# Patient Record
Sex: FEMALE | Race: WHITE | Employment: FULL TIME | URBAN - METROPOLITAN AREA
[De-identification: names, ages, dates, MRNs, and addresses within clinical notes are randomized per-mention and may not be internally consistent; named-entity substitution may affect disease eponyms.]

---

## 2017-12-12 PROCEDURE — 88175 CYTOPATH C/V AUTO FLUID REDO: CPT | Performed by: OBSTETRICS & GYNECOLOGY

## 2017-12-12 PROCEDURE — 87624 HPV HI-RISK TYP POOLED RSLT: CPT | Performed by: OBSTETRICS & GYNECOLOGY

## 2021-03-29 ENCOUNTER — LAB REQUISITION (OUTPATIENT)
Dept: LAB | Facility: HOSPITAL | Age: 61
End: 2021-03-29
Payer: COMMERCIAL

## 2021-03-29 DIAGNOSIS — K13.79 OTHER LESIONS OF ORAL MUCOSA: ICD-10-CM

## 2021-03-29 PROCEDURE — 88305 TISSUE EXAM BY PATHOLOGIST: CPT | Performed by: DENTIST

## 2022-01-30 ENCOUNTER — ANESTHESIA EVENT (OUTPATIENT)
Dept: SURGERY | Facility: HOSPITAL | Age: 62
End: 2022-01-30
Payer: COMMERCIAL

## 2022-01-30 ENCOUNTER — APPOINTMENT (OUTPATIENT)
Dept: CT IMAGING | Facility: HOSPITAL | Age: 62
End: 2022-01-30
Attending: EMERGENCY MEDICINE
Payer: COMMERCIAL

## 2022-01-30 ENCOUNTER — ANESTHESIA (OUTPATIENT)
Dept: SURGERY | Facility: HOSPITAL | Age: 62
End: 2022-01-30
Payer: COMMERCIAL

## 2022-01-30 ENCOUNTER — HOSPITAL ENCOUNTER (EMERGENCY)
Facility: HOSPITAL | Age: 62
Discharge: HOME | End: 2022-01-31
Attending: EMERGENCY MEDICINE
Payer: COMMERCIAL

## 2022-01-30 DIAGNOSIS — K35.80 ACUTE APPENDICITIS: ICD-10-CM

## 2022-01-30 DIAGNOSIS — K35.80 ACUTE APPENDICITIS, UNSPECIFIED ACUTE APPENDICITIS TYPE: Primary | ICD-10-CM

## 2022-01-30 DIAGNOSIS — I10 HYPERTENSION, UNSPECIFIED TYPE: ICD-10-CM

## 2022-01-30 LAB
ALBUMIN SERPL-MCNC: 3.8 G/DL (ref 3.4–5)
ALBUMIN/GLOB SERPL: 1.1 {RATIO} (ref 1–2)
ALP LIVER SERPL-CCNC: 69 U/L
ALT SERPL-CCNC: 17 U/L
ANION GAP SERPL CALC-SCNC: 6 MMOL/L (ref 0–18)
AST SERPL-CCNC: 14 U/L (ref 15–37)
BASOPHILS # BLD AUTO: 0.03 X10(3) UL (ref 0–0.2)
BASOPHILS NFR BLD AUTO: 0.3 %
BILIRUB SERPL-MCNC: 1 MG/DL (ref 0.1–2)
BILIRUB UR QL STRIP.AUTO: NEGATIVE
BUN BLD-MCNC: 7 MG/DL (ref 7–18)
CALCIUM BLD-MCNC: 9 MG/DL (ref 8.5–10.1)
CHLORIDE SERPL-SCNC: 102 MMOL/L (ref 98–112)
CLARITY UR REFRACT.AUTO: CLEAR
CO2 SERPL-SCNC: 27 MMOL/L (ref 21–32)
CREAT BLD-MCNC: 0.81 MG/DL
EOSINOPHIL # BLD AUTO: 0.01 X10(3) UL (ref 0–0.7)
EOSINOPHIL NFR BLD AUTO: 0.1 %
ERYTHROCYTE [DISTWIDTH] IN BLOOD BY AUTOMATED COUNT: 13.6 %
GLOBULIN PLAS-MCNC: 3.6 G/DL (ref 2.8–4.4)
GLUCOSE BLD-MCNC: 93 MG/DL (ref 70–99)
GLUCOSE UR STRIP.AUTO-MCNC: NEGATIVE MG/DL
HCT VFR BLD AUTO: 35.4 %
HGB BLD-MCNC: 12.1 G/DL
IMM GRANULOCYTES # BLD AUTO: 0.02 X10(3) UL (ref 0–1)
IMM GRANULOCYTES NFR BLD: 0.2 %
LEUKOCYTE ESTERASE UR QL STRIP.AUTO: NEGATIVE
LIPASE SERPL-CCNC: 109 U/L (ref 73–393)
LYMPHOCYTES # BLD AUTO: 1.42 X10(3) UL (ref 1–4)
LYMPHOCYTES NFR BLD AUTO: 13.2 %
MCHC RBC AUTO-ENTMCNC: 34.2 G/DL (ref 31–37)
MCV RBC AUTO: 86.6 FL
MONOCYTES # BLD AUTO: 0.69 X10(3) UL (ref 0.1–1)
MONOCYTES NFR BLD AUTO: 6.4 %
NEUTROPHILS # BLD AUTO: 8.61 X10 (3) UL (ref 1.5–7.7)
NEUTROPHILS # BLD AUTO: 8.61 X10(3) UL (ref 1.5–7.7)
NEUTROPHILS NFR BLD AUTO: 79.8 %
NITRITE UR QL STRIP.AUTO: NEGATIVE
OSMOLALITY SERPL CALC.SUM OF ELEC: 278 MOSM/KG (ref 275–295)
PH UR STRIP.AUTO: 6 [PH] (ref 5–8)
PLATELET # BLD AUTO: 204 10(3)UL (ref 150–450)
POTASSIUM SERPL-SCNC: 3.6 MMOL/L (ref 3.5–5.1)
PROT SERPL-MCNC: 7.4 G/DL (ref 6.4–8.2)
PROT UR STRIP.AUTO-MCNC: NEGATIVE MG/DL
RBC # BLD AUTO: 4.09 X10(6)UL
SARS-COV-2 RNA RESP QL NAA+PROBE: NOT DETECTED
SODIUM SERPL-SCNC: 135 MMOL/L (ref 136–145)
SP GR UR STRIP.AUTO: 1 (ref 1–1.03)
UROBILINOGEN UR STRIP.AUTO-MCNC: <2 MG/DL
WBC # BLD AUTO: 10.8 X10(3) UL (ref 4–11)

## 2022-01-30 PROCEDURE — 85025 COMPLETE CBC W/AUTO DIFF WBC: CPT

## 2022-01-30 PROCEDURE — 81001 URINALYSIS AUTO W/SCOPE: CPT | Performed by: EMERGENCY MEDICINE

## 2022-01-30 PROCEDURE — 80053 COMPREHEN METABOLIC PANEL: CPT

## 2022-01-30 PROCEDURE — 88342 IMHCHEM/IMCYTCHM 1ST ANTB: CPT | Performed by: SURGERY

## 2022-01-30 PROCEDURE — 88341 IMHCHEM/IMCYTCHM EA ADD ANTB: CPT | Performed by: SURGERY

## 2022-01-30 PROCEDURE — 85025 COMPLETE CBC W/AUTO DIFF WBC: CPT | Performed by: EMERGENCY MEDICINE

## 2022-01-30 PROCEDURE — 88304 TISSUE EXAM BY PATHOLOGIST: CPT | Performed by: SURGERY

## 2022-01-30 PROCEDURE — 96361 HYDRATE IV INFUSION ADD-ON: CPT

## 2022-01-30 PROCEDURE — 80053 COMPREHEN METABOLIC PANEL: CPT | Performed by: EMERGENCY MEDICINE

## 2022-01-30 PROCEDURE — 83690 ASSAY OF LIPASE: CPT | Performed by: EMERGENCY MEDICINE

## 2022-01-30 PROCEDURE — 99285 EMERGENCY DEPT VISIT HI MDM: CPT

## 2022-01-30 PROCEDURE — 96375 TX/PRO/DX INJ NEW DRUG ADDON: CPT

## 2022-01-30 PROCEDURE — 74177 CT ABD & PELVIS W/CONTRAST: CPT | Performed by: EMERGENCY MEDICINE

## 2022-01-30 PROCEDURE — 96374 THER/PROPH/DIAG INJ IV PUSH: CPT

## 2022-01-30 PROCEDURE — 0DTJ4ZZ RESECTION OF APPENDIX, PERCUTANEOUS ENDOSCOPIC APPROACH: ICD-10-PCS | Performed by: SURGERY

## 2022-01-30 RX ORDER — SODIUM CHLORIDE 9 MG/ML
125 INJECTION, SOLUTION INTRAVENOUS CONTINUOUS
Status: DISCONTINUED | OUTPATIENT
Start: 2022-01-30 | End: 2022-01-31

## 2022-01-30 RX ORDER — NEOSTIGMINE METHYLSULFATE 1 MG/ML
INJECTION INTRAVENOUS AS NEEDED
Status: DISCONTINUED | OUTPATIENT
Start: 2022-01-30 | End: 2022-02-02 | Stop reason: SURG

## 2022-01-30 RX ORDER — ONDANSETRON 4 MG/1
4 TABLET, ORALLY DISINTEGRATING ORAL EVERY 4 HOURS PRN
Qty: 10 TABLET | Refills: 0 | Status: SHIPPED | OUTPATIENT
Start: 2022-01-30

## 2022-01-30 RX ORDER — DEXAMETHASONE SODIUM PHOSPHATE 4 MG/ML
VIAL (ML) INJECTION AS NEEDED
Status: DISCONTINUED | OUTPATIENT
Start: 2022-01-30 | End: 2022-02-02 | Stop reason: SURG

## 2022-01-30 RX ORDER — ONDANSETRON 2 MG/ML
4 INJECTION INTRAMUSCULAR; INTRAVENOUS ONCE
Status: COMPLETED | OUTPATIENT
Start: 2022-01-30 | End: 2022-01-30

## 2022-01-30 RX ORDER — ONDANSETRON 2 MG/ML
INJECTION INTRAMUSCULAR; INTRAVENOUS AS NEEDED
Status: DISCONTINUED | OUTPATIENT
Start: 2022-01-30 | End: 2022-02-02 | Stop reason: SURG

## 2022-01-30 RX ORDER — SODIUM CHLORIDE, SODIUM LACTATE, POTASSIUM CHLORIDE, CALCIUM CHLORIDE 600; 310; 30; 20 MG/100ML; MG/100ML; MG/100ML; MG/100ML
INJECTION, SOLUTION INTRAVENOUS CONTINUOUS
Status: DISCONTINUED | OUTPATIENT
Start: 2022-01-30 | End: 2022-01-31 | Stop reason: HOSPADM

## 2022-01-30 RX ORDER — ONDANSETRON 2 MG/ML
4 INJECTION INTRAMUSCULAR; INTRAVENOUS AS NEEDED
Status: DISCONTINUED | OUTPATIENT
Start: 2022-01-30 | End: 2022-01-31 | Stop reason: HOSPADM

## 2022-01-30 RX ORDER — HYDROMORPHONE HYDROCHLORIDE 1 MG/ML
0.4 INJECTION, SOLUTION INTRAMUSCULAR; INTRAVENOUS; SUBCUTANEOUS EVERY 5 MIN PRN
Status: DISCONTINUED | OUTPATIENT
Start: 2022-01-30 | End: 2022-01-31 | Stop reason: HOSPADM

## 2022-01-30 RX ORDER — METOCLOPRAMIDE HYDROCHLORIDE 5 MG/ML
INJECTION INTRAMUSCULAR; INTRAVENOUS AS NEEDED
Status: DISCONTINUED | OUTPATIENT
Start: 2022-01-30 | End: 2022-02-02 | Stop reason: SURG

## 2022-01-30 RX ORDER — HYDROCODONE BITARTRATE AND ACETAMINOPHEN 5; 325 MG/1; MG/1
1 TABLET ORAL AS NEEDED
Status: COMPLETED | OUTPATIENT
Start: 2022-01-30 | End: 2022-01-30

## 2022-01-30 RX ORDER — ONDANSETRON 2 MG/ML
INJECTION INTRAMUSCULAR; INTRAVENOUS
Status: COMPLETED
Start: 2022-01-30 | End: 2022-01-30

## 2022-01-30 RX ORDER — MEPERIDINE HYDROCHLORIDE 25 MG/ML
12.5 INJECTION INTRAMUSCULAR; INTRAVENOUS; SUBCUTANEOUS AS NEEDED
Status: DISCONTINUED | OUTPATIENT
Start: 2022-01-30 | End: 2022-01-31 | Stop reason: HOSPADM

## 2022-01-30 RX ORDER — GLYCOPYRROLATE 0.2 MG/ML
INJECTION, SOLUTION INTRAMUSCULAR; INTRAVENOUS AS NEEDED
Status: DISCONTINUED | OUTPATIENT
Start: 2022-01-30 | End: 2022-02-02 | Stop reason: SURG

## 2022-01-30 RX ORDER — ROCURONIUM BROMIDE 10 MG/ML
INJECTION, SOLUTION INTRAVENOUS AS NEEDED
Status: DISCONTINUED | OUTPATIENT
Start: 2022-01-30 | End: 2022-02-02 | Stop reason: SURG

## 2022-01-30 RX ORDER — BUPIVACAINE HYDROCHLORIDE 5 MG/ML
INJECTION, SOLUTION EPIDURAL; INTRACAUDAL AS NEEDED
Status: DISCONTINUED | OUTPATIENT
Start: 2022-01-30 | End: 2022-01-30 | Stop reason: HOSPADM

## 2022-01-30 RX ORDER — TRAMADOL HYDROCHLORIDE 50 MG/1
TABLET ORAL EVERY 4 HOURS PRN
Qty: 10 TABLET | Refills: 0 | Status: SHIPPED | OUTPATIENT
Start: 2022-01-30

## 2022-01-30 RX ORDER — HYDROMORPHONE HYDROCHLORIDE 1 MG/ML
0.5 INJECTION, SOLUTION INTRAMUSCULAR; INTRAVENOUS; SUBCUTANEOUS EVERY 30 MIN PRN
Status: DISCONTINUED | OUTPATIENT
Start: 2022-01-30 | End: 2022-01-31

## 2022-01-30 RX ORDER — NALOXONE HYDROCHLORIDE 0.4 MG/ML
80 INJECTION, SOLUTION INTRAMUSCULAR; INTRAVENOUS; SUBCUTANEOUS AS NEEDED
Status: DISCONTINUED | OUTPATIENT
Start: 2022-01-30 | End: 2022-01-31 | Stop reason: HOSPADM

## 2022-01-30 RX ORDER — HYDROCODONE BITARTRATE AND ACETAMINOPHEN 5; 325 MG/1; MG/1
TABLET ORAL
Status: COMPLETED
Start: 2022-01-30 | End: 2022-01-30

## 2022-01-30 RX ORDER — MIDAZOLAM HYDROCHLORIDE 1 MG/ML
1 INJECTION INTRAMUSCULAR; INTRAVENOUS EVERY 5 MIN PRN
Status: DISCONTINUED | OUTPATIENT
Start: 2022-01-30 | End: 2022-01-31 | Stop reason: HOSPADM

## 2022-01-30 RX ORDER — KETOROLAC TROMETHAMINE 30 MG/ML
INJECTION, SOLUTION INTRAMUSCULAR; INTRAVENOUS AS NEEDED
Status: DISCONTINUED | OUTPATIENT
Start: 2022-01-30 | End: 2022-02-02 | Stop reason: SURG

## 2022-01-30 RX ORDER — HYDROCODONE BITARTRATE AND ACETAMINOPHEN 5; 325 MG/1; MG/1
2 TABLET ORAL AS NEEDED
Status: COMPLETED | OUTPATIENT
Start: 2022-01-30 | End: 2022-01-30

## 2022-01-30 RX ADMIN — DEXAMETHASONE SODIUM PHOSPHATE 4 MG: 4 MG/ML VIAL (ML) INJECTION at 22:43:00

## 2022-01-30 RX ADMIN — NEOSTIGMINE METHYLSULFATE 3 MG: 1 INJECTION INTRAVENOUS at 22:37:00

## 2022-01-30 RX ADMIN — ONDANSETRON 4 MG: 2 INJECTION INTRAMUSCULAR; INTRAVENOUS at 22:43:00

## 2022-01-30 RX ADMIN — KETOROLAC TROMETHAMINE 30 MG: 30 INJECTION, SOLUTION INTRAMUSCULAR; INTRAVENOUS at 22:49:00

## 2022-01-30 RX ADMIN — ROCURONIUM BROMIDE 10 MG: 10 INJECTION, SOLUTION INTRAVENOUS at 21:54:00

## 2022-01-30 RX ADMIN — METOCLOPRAMIDE HYDROCHLORIDE 10 MG: 5 INJECTION INTRAMUSCULAR; INTRAVENOUS at 22:43:00

## 2022-01-30 RX ADMIN — GLYCOPYRROLATE 0.4 MG: 0.2 INJECTION, SOLUTION INTRAMUSCULAR; INTRAVENOUS at 22:37:00

## 2022-01-30 NOTE — ED INITIAL ASSESSMENT (HPI)
Pt presents to ER with abd pain starting yesterday. Pt first noted nausea and gas yesterday afternoon. Pt states feeling full. Last BM yesterday morning. No vomiting. Yes nausea. Slight fever. Pt is speaking clearly. Skin warm and dry. Respirations equal and nonlabored. Pt is a&ox3. RLQ tenderness noted.

## 2022-01-31 VITALS
WEIGHT: 128 LBS | OXYGEN SATURATION: 97 % | SYSTOLIC BLOOD PRESSURE: 97 MMHG | HEART RATE: 58 BPM | HEIGHT: 64 IN | BODY MASS INDEX: 21.85 KG/M2 | RESPIRATION RATE: 17 BRPM | TEMPERATURE: 98 F | DIASTOLIC BLOOD PRESSURE: 62 MMHG

## 2022-01-31 NOTE — ED QUICK NOTES
Patient resting comfortably on bed, respirations easy and unlabored. Denies any needs at this time.   Pain 4/10, patient states that she does not require any pain medication at this time

## 2022-01-31 NOTE — ANESTHESIA PROCEDURE NOTES
Airway  Date/Time: 1/30/2022 9:58 PM  Urgency: elective      General Information and Staff    Patient location during procedure: OR  Anesthesiologist: Madeline Bentley MD  Performed: anesthesiologist     Indications and Patient Condition  Indications for airway management: anesthesia  Sedation level: deep  Preoxygenated: yes  Patient position: sniffing  Mask difficulty assessment: 1 - vent by mask    Final Airway Details  Final airway type: endotracheal airway      Successful airway: ETT  Cuffed: yes   Successful intubation technique: Video laryngoscopy  Endotracheal tube insertion site: oral  Blade: GlideScope  Blade size: #4  ETT size (mm): 7.5    Cormack-Lehane Classification: grade I - full view of glottis  Placement verified by: chest auscultation and capnometry   Measured from: lips  Number of attempts at approach: 1

## 2022-01-31 NOTE — OPERATIVE REPORT
[facility]         Patient Name: Jorje Acuna   MRN: [id number]     Date of Operation:  2022   Site:  [facility]       : 1/15/1960       PREOPERATIVE DIAGNOSES:  Acute appendicitis     POSTOPERATIVE DIAGNOSES:  Same     PROCEDURE PERFORMED:  Laparoscopic appendectomy     SURGEON: ANTWON Mars: Scrub technician     ANESTHESIA:  General     COMPLICATIONS: None     ESTIMATED BLOOD LOSS:  5 mL     SPECIMEN:  Appendix     IMPLANTS: None     GRAFTS: None      DRAINS: None     BLOOD PRODUCTS ADMINISTERED: None     HISTORY: This patient is a 63year old-year-old female who has had abdominal pain for 2 days, localizing to the right lower quadrant today, and associated with nausea and fever. She was evaluated and found to have the clinical picture and CT scan consistent with acute appendicitis. She was seen in surgical consultation, and laparoscopic appendectomy was recommended. The risks, benefits, and alternatives were discussed with her and her . FINDINGS:  Uncomplicated acute appendicitis     PROCEDURE: The patient was seen in the emergency department with her . The surgical plan was reviewed. She was taken to the operating room, placed in the supine position, and administered general anesthesia. The abdomen was prepped and draped. A timeout was performed. Local anesthetic was infiltrated. A Veress needle was introduced below the umbilicus. The abdomen was insufflated with carbon dioxide gas. A 12 mm trocar was inserted, followed by the scope. There was no entry injury. Additional local anesthetic was infiltrated, and 2 additional 5 mm trochars were placed in the lower abdomen under laparoscopic vision. The abdomen and pelvis was inspected. There was a small amount of reactive fluid. The visualization of peritoneal surfaces, omentum, intestines, and liver surfaces was unremarkable. The patient was repositioned for right lower quadrant exposure.   The appendix was identified and noted to be thickened and inflamed. There was a small amount of fibrin. There was no gangrene or perforation. A window was made in the mesoappendix at the base of the cecum, and the mesoappendix was divided with harmonic scalpel. The appendix was amputated with a white load of the [place] stapler flush with the cecum. The appendix was extracted from the patient in a specimen retrieval bag and forwarded to pathology. The staple line was noted to be intact and hemostatic. The mesenteric resection line was hemostatic. The abdomen and pelvis was copiously irrigated and aspirated until the irrigant returned clear. Completion inspection of the abdominal cavity confirmed the absence of inadvertent injury. The omentum was positioned over the appendectomy site. The fascial closure device was used to secure the fascia at the 12 mm trocar site with 0 Vicryl suture. The remaining trochars were removed under direct vision internally to assure hemostasis. The abdomen was desufflated. The wounds were irrigated, rendered hemostatic, closed with Vicryl subcuticular stitches, and dressed with Dermabond. The needle, sponge, and instrument counts were reported as correct. The patient was awakened and transported to recovery. Her  was notified of the findings and her status.         Gardenia Méndez MD

## 2022-01-31 NOTE — ED QUICK NOTES
Patient's  leaving at this time. Patient's  taking necklace, ring, and purse at this time.     Patient states last ate at 1pm, and last drank at approx 2pm

## 2022-02-08 NOTE — PROGRESS NOTES
Noted.  Discussed with Dr. Lance Hanson. I called the patient today to discuss the findings. She is doing remarkably well after surgery. We discussed the pathology findings. We discussed that it is extremely fortunate that she developed acute appendicitis, in order for the small cancer to be identified. We discussed the negative margins, grade 2, extension into the wall of the appendix, etc.  I recommended a meeting with medical oncology, updated colonoscopy if she has not had one recently, and staging. To discuss further with medical oncology regarding the role of radical resection to include lymph node resection. It is possible she will require laparoscopic right hemicolectomy.

## 2022-02-17 PROBLEM — C18.1 CANCER OF APPENDIX (HCC): Status: ACTIVE | Noted: 2022-02-17

## 2022-11-29 ENCOUNTER — TELEPHONE (OUTPATIENT)
Dept: SURGERY | Facility: CLINIC | Age: 62
End: 2022-11-29

## 2022-12-14 ENCOUNTER — OFFICE VISIT (OUTPATIENT)
Dept: SURGERY | Facility: CLINIC | Age: 62
End: 2022-12-14
Payer: COMMERCIAL

## 2022-12-14 VITALS
OXYGEN SATURATION: 97 % | WEIGHT: 131 LBS | HEART RATE: 62 BPM | RESPIRATION RATE: 16 BRPM | BODY MASS INDEX: 22 KG/M2 | SYSTOLIC BLOOD PRESSURE: 113 MMHG | TEMPERATURE: 98 F | DIASTOLIC BLOOD PRESSURE: 67 MMHG

## 2022-12-14 DIAGNOSIS — C18.1 CANCER OF APPENDIX (HCC): Primary | ICD-10-CM

## 2022-12-14 PROBLEM — K35.80 ACUTE APPENDICITIS, UNSPECIFIED ACUTE APPENDICITIS TYPE: Status: RESOLVED | Noted: 2022-01-30 | Resolved: 2022-12-14

## 2022-12-21 ENCOUNTER — TELEPHONE (OUTPATIENT)
Dept: SURGERY | Facility: CLINIC | Age: 62
End: 2022-12-21

## 2022-12-21 DIAGNOSIS — C18.1 APPENDIX CARCINOMA (HCC): Primary | ICD-10-CM

## 2022-12-21 NOTE — TELEPHONE ENCOUNTER
Called patient with tumor board discussion. No answer. Recommend imaging in 6 months (May 2023) after which, yearly. Left a MyChart message for patient.

## 2022-12-22 ENCOUNTER — TELEPHONE (OUTPATIENT)
Dept: SURGERY | Facility: CLINIC | Age: 62
End: 2022-12-22

## 2023-05-01 ENCOUNTER — HOSPITAL ENCOUNTER (OUTPATIENT)
Dept: CT IMAGING | Facility: HOSPITAL | Age: 63
Discharge: HOME | End: 2023-05-01
Attending: SURGERY
Payer: COMMERCIAL

## 2023-05-01 DIAGNOSIS — C18.1 APPENDIX CARCINOMA (HCC): ICD-10-CM

## 2023-05-01 LAB
CREAT BLD-MCNC: 0.9 MG/DL
GFR SERPLBLD BASED ON 1.73 SQ M-ARVRAT: 73 ML/MIN/1.73M2 (ref 60–?)

## 2023-05-01 PROCEDURE — 82565 ASSAY OF CREATININE: CPT

## 2023-05-01 PROCEDURE — 74177 CT ABD & PELVIS W/CONTRAST: CPT | Performed by: SURGERY

## 2023-11-14 DIAGNOSIS — C18.1 CANCER OF APPENDIX (HCC): Primary | ICD-10-CM

## 2023-11-15 ENCOUNTER — LAB ENCOUNTER (OUTPATIENT)
Dept: LAB | Age: 63
End: 2023-11-15
Attending: SURGERY
Payer: COMMERCIAL

## 2023-11-15 DIAGNOSIS — C18.1 CANCER OF APPENDIX (HCC): ICD-10-CM

## 2023-11-15 LAB
CANCER AG125 SERPL-ACNC: 13.7 U/ML (ref ?–35)
CANCER AG19-9 SERPL-ACNC: 29.7 U/ML (ref ?–37)
CEA SERPL-MCNC: 1.4 NG/ML (ref ?–5)

## 2023-11-15 PROCEDURE — 86301 IMMUNOASSAY TUMOR CA 19-9: CPT | Performed by: SURGERY

## 2023-11-15 PROCEDURE — 82378 CARCINOEMBRYONIC ANTIGEN: CPT | Performed by: SURGERY

## 2023-11-15 PROCEDURE — 86304 IMMUNOASSAY TUMOR CA 125: CPT | Performed by: SURGERY

## 2023-11-27 ENCOUNTER — HOSPITAL ENCOUNTER (OUTPATIENT)
Dept: CT IMAGING | Facility: HOSPITAL | Age: 63
Discharge: HOME | End: 2023-11-27
Attending: SURGERY
Payer: COMMERCIAL

## 2023-11-27 DIAGNOSIS — C18.1 CANCER OF APPENDIX (HCC): ICD-10-CM

## 2023-11-27 LAB
CREAT BLD-MCNC: 0.9 MG/DL
EGFRCR SERPLBLD CKD-EPI 2021: 73 ML/MIN/1.73M2 (ref 60–?)

## 2023-11-27 PROCEDURE — 82565 ASSAY OF CREATININE: CPT

## 2023-11-27 PROCEDURE — 74177 CT ABD & PELVIS W/CONTRAST: CPT | Performed by: SURGERY

## 2023-12-13 ENCOUNTER — OFFICE VISIT (OUTPATIENT)
Dept: SURGERY | Facility: CLINIC | Age: 63
End: 2023-12-13
Payer: COMMERCIAL

## 2023-12-13 VITALS
BODY MASS INDEX: 22.5 KG/M2 | DIASTOLIC BLOOD PRESSURE: 77 MMHG | WEIGHT: 131.81 LBS | TEMPERATURE: 97 F | SYSTOLIC BLOOD PRESSURE: 112 MMHG | HEIGHT: 64.02 IN | HEART RATE: 68 BPM | RESPIRATION RATE: 14 BRPM | OXYGEN SATURATION: 97 %

## 2023-12-13 DIAGNOSIS — K62.6 RECTAL ULCER: ICD-10-CM

## 2023-12-13 DIAGNOSIS — K62.6 SOLITARY RECTAL ULCER: ICD-10-CM

## 2023-12-13 DIAGNOSIS — C18.1 CANCER OF APPENDIX (HCC): Primary | ICD-10-CM

## 2023-12-13 PROCEDURE — 3008F BODY MASS INDEX DOCD: CPT | Performed by: SURGERY

## 2023-12-13 PROCEDURE — 3074F SYST BP LT 130 MM HG: CPT | Performed by: SURGERY

## 2023-12-13 PROCEDURE — 99213 OFFICE O/P EST LOW 20 MIN: CPT | Performed by: SURGERY

## 2023-12-13 PROCEDURE — 3078F DIAST BP <80 MM HG: CPT | Performed by: SURGERY

## 2023-12-13 RX ORDER — MELOXICAM 15 MG/1
15 TABLET ORAL DAILY
COMMUNITY
Start: 2023-11-16

## 2024-07-15 ENCOUNTER — TELEPHONE (OUTPATIENT)
Dept: SURGERY | Facility: CLINIC | Age: 64
End: 2024-07-15

## 2024-07-15 DIAGNOSIS — C18.1 APPENDIX CARCINOMA (HCC): Primary | ICD-10-CM

## 2024-07-15 NOTE — TELEPHONE ENCOUNTER
Called patient for follow up appointment. Stated she got TM labs done with PCP. Labs reviewed    Component      Latest Ref Rng 11/15/2023   CARBOHYDRATE AG 19-9      <=37.0 U/mL 29.7    CANCER  ()      <=35.0 U/mL 13.7    CEA      <=5.0 ng/mL 1.4      4/23/2024:   17.8  CA 19-9 26.2  CEA 2.0    Patient expressed feeling well with no nausea, vomiting, diarrhea, constipation or abdominal bloating. She is getting hip replacement and would like to hold off on follow up until after. CT A/P due in November. Orders placed and follow up scheduled.     TREMAYNE Mendez

## 2024-11-01 ENCOUNTER — HOSPITAL ENCOUNTER (OUTPATIENT)
Dept: CT IMAGING | Facility: HOSPITAL | Age: 64
Discharge: HOME | End: 2024-11-01
Payer: COMMERCIAL

## 2024-11-01 ENCOUNTER — LAB ENCOUNTER (OUTPATIENT)
Dept: LAB | Facility: HOSPITAL | Age: 64
End: 2024-11-01
Payer: COMMERCIAL

## 2024-11-01 DIAGNOSIS — C18.1 APPENDIX CARCINOMA (HCC): ICD-10-CM

## 2024-11-01 LAB
CANCER AG125 SERPL-ACNC: 12 U/ML (ref ?–30.2)
CANCER AG19-9 SERPL-ACNC: 32.4 U/ML (ref ?–35)
CEA SERPL-MCNC: 1 NG/ML (ref ?–5)
CREAT BLD-MCNC: 0.8 MG/DL
EGFRCR SERPLBLD CKD-EPI 2021: 83 ML/MIN/1.73M2 (ref 60–?)

## 2024-11-01 PROCEDURE — 82565 ASSAY OF CREATININE: CPT

## 2024-11-01 PROCEDURE — 86304 IMMUNOASSAY TUMOR CA 125: CPT

## 2024-11-01 PROCEDURE — 82378 CARCINOEMBRYONIC ANTIGEN: CPT

## 2024-11-01 PROCEDURE — 86301 IMMUNOASSAY TUMOR CA 19-9: CPT

## 2024-11-01 PROCEDURE — 36415 COLL VENOUS BLD VENIPUNCTURE: CPT

## 2024-11-01 PROCEDURE — 74177 CT ABD & PELVIS W/CONTRAST: CPT

## 2024-11-13 ENCOUNTER — OFFICE VISIT (OUTPATIENT)
Dept: SURGERY | Facility: CLINIC | Age: 64
End: 2024-11-13
Payer: COMMERCIAL

## 2024-11-13 VITALS
RESPIRATION RATE: 18 BRPM | OXYGEN SATURATION: 98 % | HEART RATE: 68 BPM | WEIGHT: 123 LBS | SYSTOLIC BLOOD PRESSURE: 116 MMHG | BODY MASS INDEX: 21 KG/M2 | DIASTOLIC BLOOD PRESSURE: 66 MMHG | TEMPERATURE: 98 F

## 2024-11-13 DIAGNOSIS — C18.1 CANCER OF APPENDIX (HCC): Primary | ICD-10-CM

## 2024-11-13 PROCEDURE — 3078F DIAST BP <80 MM HG: CPT | Performed by: SURGERY

## 2024-11-13 PROCEDURE — 99213 OFFICE O/P EST LOW 20 MIN: CPT | Performed by: SURGERY

## 2024-11-13 PROCEDURE — 3074F SYST BP LT 130 MM HG: CPT | Performed by: SURGERY

## 2024-11-13 NOTE — PROGRESS NOTES
[facility] Surgical Oncology      Patient Name:  Elsie Crawford   YOB: 1960   Gender:  Female   Appt Date:  11/13/2024   Provider:  Mahad Wilkinson MD     PATIENT PROVIDERS  Primary Care Provider:Marsha Vazquez MD   Address: [facility] [address] [place] [address]   Phone #: [phone]       CHIEF COMPLAINT  Chief Complaint   Patient presents with    Follow - Up     Cancer of appendix        PROBLEMS  Reviewed   Patient Active Problem List   Diagnosis    Essential hypertension    Cancer of appendix (HCC)    Solitary rectal ulcer        History of Present Illness:  Goblet Cell Adenocarcinoma of Appendix     1/30/2022: Presented to [place] ED with acute abdominal pain. CT appendix with findings of acute appendicitis without abscess as well as a focal region of hypo enhancement involving the left lobe of the liver segment 4.  Underwent laparoscopic appendectomy by Dr. Ulloa.  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  2/18/2022: MRI liver without evidence of metastatic disease in the liver. Previous abnormality in segment 4 most likely perfusional variant.  3/1/2022: CEA <2.0, CA 19-9 40,  22  3/16/2022: Diagnostic laparoscopy, laparoscopic right hemicolectomy  Colon and ileum with previous appendectomy site, no evidence of tumor, thirty lymph nodes without evidence for tumor (0/30)  Started under surveillance with CT scan every 3 months  4/28/2022: CT c/a/p with Thickening sigmoid colon with submucosal hypoattenuation possibly representing fat deposition. There is however additionally significant fat stranding in the mesentery adjacent to the sigmoid colon. Differential considerations include diverticulitis, focal colitis and peritoneal disease possibly superimposed segmental colitis.  5/17/2022: Flexible sigmoidoscopy normal perianal, rectal, and examined  colon.  8/23/2022: CT a/p without recurrent or metastatic disease with near complete resolution of sigmoid colitis.  11/14/2022: CT c/a/p without evidence for metastasis.  CEA <2, CA 19-9 24,  16    Interval History:  She had a total left hip replacement and is planning for right hip replacement.   She did noticed 2 weeks ago she started having looser stools. She is having bowel movements twice.   Her last colonoscopy was was in 2/2022 and she had a flex sig 5/2022.   She has lost a few pounds.      Vital Signs:  /66 (BP Location: Right arm, Patient Position: Sitting, Cuff Size: adult)   Pulse 68   Temp 97.8 °F (36.6 °C) (Tympanic)   Resp 18   Wt 55.8 kg (123 lb)   LMP 11/16/2016   SpO2 98%   BMI 21.10 kg/m²      Medications Reviewed:    Current Outpatient Medications:     Meloxicam 15 MG Oral Tab, Take 1 tablet (15 mg total) by mouth daily., Disp: , Rfl:     Valsartan-hydroCHLOROthiazide 160-12.5 MG Oral Tab, Take 0.5 tablets by mouth daily., Disp: 45 tablet, Rfl: 3    Multiple Vitamins-Minerals (CENTRUM OR), Take 1 Tab by mouth daily., Disp: , Rfl:      Allergies Reviewed:  Allergies[1]     History:  Reviewed:  Past Medical History:    Abdominal hernia    Acute appendicitis, unspecified acute appendicitis type    AK (actinic keratosis)    s/p rx for 150+ lesions on arms    Basal cell cancer    neck, s/p MOHS    Blood disorder    Factor 5 Lieden    Cancer (HCC)    basal & squamous-chest,hand,lower back    Dysplastic nevi    Hoarse voice quality    TVC nodules, GERD, dysphagia    Ocular migraine    7/06 MRI,carotid us neg      Reviewed:  Past Surgical History:   Procedure Laterality Date    Colon surgery      Right hemicolectomy    Hc cervical cerclage  1998 & 1999    Laparoscopic appendectomy      Other surgical history      Skin surgery      basal & squamous-chest, hand, lower back      Reviewed Social History:  Social History     Socioeconomic History    Marital status:    Tobacco Use     Smoking status: Never    Smokeless tobacco: Never   Vaping Use    Vaping status: Never Used   Substance and Sexual Activity    Alcohol use: No     Alcohol/week: 0.0 standard drinks of alcohol    Drug use: No    Sexual activity: Not Currently   Social History Narrative    Health Mnt:    Pap:  08    Mamm: Dr. Ruiz 7/09,7/10    Breast exam: 1/10    DEXA: 10/02 osteopenia    Cholesterol: 6/98 (79), 10/08 (85), 9/10 (112)    UFCT: 9/10 0.0    Colon: n/a    H/O:                  Social Drivers of Health     Financial Resource Strain: Low Risk  (2/28/2022)    [facility]    Overall Financial Resource Strain (CARDIA)     Difficulty of Paying Living Expenses: Not hard at all   Food Insecurity: No Food Insecurity (2/28/2022)    Received from [facility]    Hunger Vital Sign     Worried About Running Out of Food in the Last Year: Never true     Ran Out of Food in the Last Year: Never true   Transportation Needs: No Transportation Needs (2/28/2022)    [facility]    PRAPARE - Transportation     Lack of Transportation (Medical): No     Lack of Transportation (Non-Medical): No   Stress: No Stress Concern Present (2/28/2022)    [facility]    [employer] [place] [employer] - Occupational Stress Questionnaire     Feeling of Stress : Not at all    [facility]    Social Connections   Housing Stability: Unknown (2/28/2022)    [facility]    Housing Stability Vital Sign     Unable to Pay for Housing in the Last Year: No     Unstable Housing in the Last Year: No      Reviewed:  Family History   Problem Relation Age of Onset    Cancer Mother          CLL    Heart Disorder Father         afib    Cancer Father         Merkel cell carcinoma     Hypertension Father     High Blood Pressure Father     High Blood Pressure Brother     High Blood Pressure Brother     Pancreatic Cancer Maternal Grandmother     Cancer Paternal Grandmother         colon cancer in her 80's    Other (lymphoma) Maternal Uncle       Review of Systems:  Patient reports no rapid or irregular heartbeat. She reports no chest pain. She reports no nausea. She reports no vomiting. She reports no diarrhea. She reports no constipation. She reports no bright red blood per rectum. She reports no fatigue. She reports no blood in the urine hematuria, no changes in urinary habits: initiating urination requires straining, no changes in urinary habits: no hesitancy, and no change in urine appearance. She reports no headache. She reports no dizziness. She reports no easy bruising tendency. She reports no diffuse joint pains. She reports no bone pain. She reports no back pain. She reports no swollen glands. She reports no pain. She reports no numbness. She reports no shortness of breath. She reports no change in a mole. 8 lb weight loss in 1 year. No fever, no chills, and no sweating heavily at night.        Physical Examination:  Constitutional: General Appearance: healthy-appearing, well-nourished, and well-developed. Level of Distress: NAD.   Eyes: Sclera: non-icteric.   Neck: Neck: supple.   Lymph Nodes: Lymph Nodes no cervical LAD, supraclavicular LAD, axillary LAD, or inguinal LAD.   Abdomen: Inspection and Palpation: no masses, tenderness (no guarding, no rebound), or CVA tenderness and soft and non-distended. Liver: no hepatomegaly. Spleen: no splenomegaly. Hernia: none palpable.   Musculoskeletal: Extremities: no edema.   Skin: Inspection and palpation: no jaundice.      Document Review:  11/1/2024 CT A/P:  CONCLUSION:    1. When compared to 11/27/2023 CT abdomen and pelvis, there has been a total left  hip arthroplasty.  Otherwise, findings are stable without evidence for recurrent malignancy.          Procedure(s):  None     Assessment / Plan:  Goblet Cell Adenocarcinoma  -Remains ASHLEY.  -Tumor marker 6 months  -Imaging 1 year    Screening  -Current with breast screening  -Anticipate colonoscopy 2027              Electronically Signed by: Mahad Wilkinson MD           [1] No Known Allergies

## 2025-05-22 DIAGNOSIS — C18.1 CANCER OF APPENDIX (HCC): Primary | ICD-10-CM

## (undated) DIAGNOSIS — C18.1 ADENOCARCINOMA OF APPENDIX (HCC): Primary | ICD-10-CM

## (undated) DEVICE — SUTURE VICRYL 0

## (undated) DEVICE — UNDYED BRAIDED (POLYGLACTIN 910), SYNTHETIC ABSORBABLE SUTURE: Brand: COATED VICRYL

## (undated) DEVICE — TROCAR: Brand: KII® SLEEVE

## (undated) DEVICE — HARMONIC 1100 SHEARS, 36CM SHAFT LENGTH: Brand: HARMONIC

## (undated) DEVICE — TISSUE RETRIEVAL SYSTEM: Brand: INZII RETRIEVAL SYSTEM

## (undated) DEVICE — LAP CHOLE/APPY CDS-LF: Brand: MEDLINE INDUSTRIES, INC.

## (undated) DEVICE — THE ECHELON, ECHELON ENDOPATH™ AND ECHELON FLEX™ FAMILIES OF ENDOSCOPIC LINEAR CUTTERS AND RELOADS ARE STERILE, SINGLE PATIENT USE INSTRUMENTS THAT SIMULTANEOUSLY CUT AND STAPLE TISSUE. THERE ARE SIX STAGGERED ROWS OF STAPLES, THREE ON EITHER SIDE OF THE CUT LINE. THE 45 MM INSTRUMENTS HAVE A STAPLE LINE THATIS APPROXIMATELY 45 MM LONG AND A CUT LINE THAT IS APPROXIMATELY 42 MM LONG. THE SHAFT CAN ROTATE FREELY IN BOTH DIRECTIONS AND AN ARTICULATION MECHANISM ON ARTICULATING INSTRUMENTS ENABLES BENDING THE DISTAL PORTIONOF THE SHAFT TO FACILITATE LATERAL ACCESS OF THE OPERATIVE SITE.THE INSTRUMENTS ARE SHIPPED WITHOUT A RELOAD AND MUST BE LOADED PRIOR TO USE. A STAPLE RETAINING CAP ON THE RELOAD PROTECTS THE STAPLE LEG POINTS DURING SHIPPING AND TRANSPORTATION. THE INSTRUMENTS’ LOCK-OUT FEATURE IS DESIGNED TO PREVENT A USED RELOAD FROM BEING REFIRED.: Brand: ECHELON ENDOPATH

## (undated) DEVICE — APPLICATOR COTTON TIP 6" 2/PK

## (undated) DEVICE — REM POLYHESIVE ADULT PATIENT RETURN ELECTRODE: Brand: VALLEYLAB

## (undated) DEVICE — STERILE POLYISOPRENE POWDER-FREE SURGICAL GLOVES: Brand: PROTEXIS

## (undated) DEVICE — LIGHT HANDLE

## (undated) DEVICE — SCD SLEEVE KNEE HI BLEND

## (undated) DEVICE — EXOFIN TISSUE ADHESIVE 1.0ML

## (undated) DEVICE — TROCAR: Brand: KII FIOS FIRST ENTRY

## (undated) DEVICE — ENDOPATH ULTRA VERESS INSUFFLATION NEEDLES WITH LUER LOCK CONNECTORS: Brand: ENDOPATH

## (undated) DEVICE — THE ECHELON FLEX POWERED PLUS ARTICULATING ENDOSCOPIC LINEAR CUTTERS ARE STERILE, SINGLE PATIENT USE INSTRUMENTS THAT SIMULTANEOUSLYCUT AND STAPLE TISSUE. THERE ARE SIX STAGGERED ROWS OF STAPLES, THREE ON EITHER SIDE OF THE CUT LINE. THE ECHELON FLEX 45 POWERED PLUSINSTRUMENTS HAVE A STAPLE LINE THAT IS APPROXIMATELY 45 MM LONG AND A CUT LINE THAT IS APPROXIMATELY 42 MM LONG. THE SHAFT CAN ROTATE FREELYIN BOTH DIRECTIONS AND AN ARTICULATION MECHANISM ENABLES THE DISTAL PORTION OF THE SHAFT TO PIVOT TO FACILITATE LATERAL ACCESS TO THE OPERATIVESITE.THE INSTRUMENTS ARE PACKAGED WITH A PRIMARY LITHIUM BATTERY PACK THAT MUST BE INSTALLED PRIOR TO USE. THERE ARE SPECIFIC REQUIREMENTS FORDISPOSING OF THE BATTERY PACK. REFER TO THE BATTERY PACK DISPOSAL SECTION.THE INSTRUMENTS ARE PACKAGED WITHOUT A RELOAD AND MUST BE LOADED PRIOR TO USE. A STAPLE RETAINING CAP ON THE RELOAD PROTECTS THE STAPLE LEGPOINTS DURING SHIPPING AND TRANSPORTATION. THE INSTRUMENTS’ LOCK-OUT FEATURE IS DESIGNED TO PREVENT A USED OR IMPROPERLY INSTALLED RELOADFROM BEING REFIRED OR AN INSTRUMENT FROM BEING FIRED WITHOUT A RELOAD.: Brand: ECHELON FLEX

## (undated) DEVICE — SOL  .9 1000ML BTL